# Patient Record
Sex: MALE | Race: BLACK OR AFRICAN AMERICAN | NOT HISPANIC OR LATINO | URBAN - METROPOLITAN AREA
[De-identification: names, ages, dates, MRNs, and addresses within clinical notes are randomized per-mention and may not be internally consistent; named-entity substitution may affect disease eponyms.]

---

## 2020-11-18 ENCOUNTER — OFFICE VISIT (OUTPATIENT)
Dept: URBAN - METROPOLITAN AREA CLINIC 78 | Facility: CLINIC | Age: 20
End: 2020-11-18

## 2020-12-02 ENCOUNTER — OFFICE VISIT (OUTPATIENT)
Dept: URBAN - METROPOLITAN AREA CLINIC 78 | Facility: CLINIC | Age: 20
End: 2020-12-02
Payer: COMMERCIAL

## 2020-12-02 VITALS
SYSTOLIC BLOOD PRESSURE: 130 MMHG | RESPIRATION RATE: 16 BRPM | HEIGHT: 71 IN | BODY MASS INDEX: 23.3 KG/M2 | DIASTOLIC BLOOD PRESSURE: 74 MMHG | HEART RATE: 57 BPM | WEIGHT: 166.4 LBS | TEMPERATURE: 97.8 F

## 2020-12-02 DIAGNOSIS — K83.01 PRIMARY SCLEROSING CHOLANGITIS: ICD-10-CM

## 2020-12-02 DIAGNOSIS — R93.89 ABNORMAL MRI: ICD-10-CM

## 2020-12-02 PROBLEM — 169083003 MRI SCAN ABNORMAL: Status: ACTIVE | Noted: 2020-12-02

## 2020-12-02 PROCEDURE — G8483 FLU IMM NO ADMIN DOC REA: HCPCS | Performed by: INTERNAL MEDICINE

## 2020-12-02 PROCEDURE — 99213 OFFICE O/P EST LOW 20 MIN: CPT | Performed by: INTERNAL MEDICINE

## 2020-12-02 NOTE — HPI-TODAY'S VISIT:
Patient is doing well Denies episodes of pain  Deneis fever  chills  Has seen Dr Nicolas Melendez   Did not get Flu vaccine  Occassional Marijuana use  Drink occassionally  Denies Nicotine  Normal olonoscopy with ileoscopy 12/19 by me   Reports a negative STD check in Summer  Images in 2019 :Sequential imaging including ultrasound, CT scan and lastly an MRCP are highly suggest to of intra and extra hepatic ductal changes consistent with sclerosing cholangitis versus choledochal cyst. Patient was also noted to have significant elevation in liver enzymes and mild elevation in bilirubin (-) drug use except occsional Marijuana (-) Hepatitis panel A/B/C (-) supplement use or herbal drug use (-) Fhx of IBD or liver problems

## 2020-12-03 LAB
A/G RATIO: 2.2
ALBUMIN: 4.9
ALKALINE PHOSPHATASE: 73
ALT (SGPT): 39
AST (SGOT): 28
BASO (ABSOLUTE): 0
BASOS: 1
BILIRUBIN, TOTAL: 0.9
BUN/CREATININE RATIO: 10
BUN: 11
CALCIUM: 9.6
CARBON DIOXIDE, TOTAL: 25
CHLORIDE: 106
CREATININE: 1.06
EGFR IF AFRICN AM: 115
EGFR IF NONAFRICN AM: 99
EOS (ABSOLUTE): 0
EOS: 1
GLOBULIN, TOTAL: 2.2
GLUCOSE: 95
HEMATOCRIT: 43.7
HEMATOLOGY COMMENTS:: (no result)
HEMOGLOBIN: 14.6
IMMATURE CELLS: (no result)
IMMATURE GRANS (ABS): 0
IMMATURE GRANULOCYTES: 0
LYMPHS (ABSOLUTE): 1.8
LYMPHS: 52
MCH: 28.7
MCHC: 33.4
MCV: 86
MONOCYTES(ABSOLUTE): 0.4
MONOCYTES: 10
NEUTROPHILS (ABSOLUTE): 1.3
NEUTROPHILS: 36
NRBC: (no result)
PLATELETS: 204
POTASSIUM: 4.4
PROTEIN, TOTAL: 7.1
RBC: 5.09
RDW: 14
SODIUM: 142
WBC: 3.5

## 2021-12-23 ENCOUNTER — OFFICE VISIT (OUTPATIENT)
Dept: URBAN - METROPOLITAN AREA CLINIC 78 | Facility: CLINIC | Age: 21
End: 2021-12-23
Payer: COMMERCIAL

## 2021-12-23 DIAGNOSIS — R10.10 PAIN OF UPPER ABDOMEN: ICD-10-CM

## 2021-12-23 DIAGNOSIS — K83.01 PRIMARY SCLEROSING CHOLANGITIS: ICD-10-CM

## 2021-12-23 PROCEDURE — 99214 OFFICE O/P EST MOD 30 MIN: CPT | Performed by: INTERNAL MEDICINE

## 2021-12-23 NOTE — HPI-TODAY'S VISIT:
Patient known to me.   Patient is presenting with predominantly upper abdominal pain which is above the bellybutton which is gotten worse over the last 3 months. Pain is postprandial and gets aggravated by sitting and deep breathing.  It can happen both in the morning and evening .  Pain is described as central/ epigastric  7 out of 10 last 1 hour post prandial associated with chills but denies any fever ,any change in urine color ,itching.  It is associated with urgency to go more often but not runny stools .  Somewhere in February/ April he was seen and treated for chlamydia and gonorrhea via an urgent care reportedly HIV testing was negative.  He has cut down on his marijuana use.  Denies diarrhea, blood in stool, melena, unintentional weight loss, nausea vomiting, pruritus, rectal bleeding. DATA : Since last visit  he has seen Dr. Nicolas Yan MRI suggestive of PSC .MRI dated 11/27/2019 which revealed intra and extrahepatic beaded appearance.   Colonoscopy with random biopsies which was unremarkable .. Ultrasound dated 11/26/2019 revealed dilated ducts MRI dated November 27, 2019 revealed intra and extrahepatic beaded appearance with few short segment strictures T bili was 3.8, alkaline phosphatase 147, ,   Immunoglobulin G subclass 4: 50 Hepatitis a/B/C: Negative ANCA: Less than 1: 20 Celiac panel negative Antiactin/AMA/smooth muscle: Negative Tox screen negative except Canniboid

## 2021-12-28 ENCOUNTER — TELEPHONE ENCOUNTER (OUTPATIENT)
Dept: URBAN - METROPOLITAN AREA CLINIC 78 | Facility: CLINIC | Age: 21
End: 2021-12-28

## 2021-12-28 RX ORDER — PANTOPRAZOLE SODIUM 40 MG/1
1 TABLET TABLET, DELAYED RELEASE ORAL ONCE A DAY
Qty: 30 | OUTPATIENT
Start: 2021-12-28

## 2022-01-07 ENCOUNTER — OFFICE VISIT (OUTPATIENT)
Dept: URBAN - METROPOLITAN AREA SURGERY CENTER 15 | Facility: SURGERY CENTER | Age: 22
End: 2022-01-07
Payer: COMMERCIAL

## 2022-01-07 ENCOUNTER — CLAIMS CREATED FROM THE CLAIM WINDOW (OUTPATIENT)
Dept: URBAN - METROPOLITAN AREA CLINIC 4 | Facility: CLINIC | Age: 22
End: 2022-01-07
Payer: COMMERCIAL

## 2022-01-07 DIAGNOSIS — K31.89 DUODENAL ERYTHEMA: ICD-10-CM

## 2022-01-07 DIAGNOSIS — R10.13 ABDOMINAL DISCOMFORT, EPIGASTRIC: ICD-10-CM

## 2022-01-07 PROCEDURE — 43239 EGD BIOPSY SINGLE/MULTIPLE: CPT | Performed by: INTERNAL MEDICINE

## 2022-01-07 PROCEDURE — G8907 PT DOC NO EVENTS ON DISCHARG: HCPCS | Performed by: INTERNAL MEDICINE

## 2022-01-07 PROCEDURE — 88312 SPECIAL STAINS GROUP 1: CPT | Performed by: PATHOLOGY

## 2022-01-07 PROCEDURE — 88305 TISSUE EXAM BY PATHOLOGIST: CPT | Performed by: PATHOLOGY

## 2022-01-07 RX ORDER — PANTOPRAZOLE SODIUM 40 MG/1
1 TABLET TABLET, DELAYED RELEASE ORAL ONCE A DAY
Qty: 30 | Status: ACTIVE | COMMUNITY
Start: 2021-12-28

## 2022-01-10 ENCOUNTER — TELEPHONE ENCOUNTER (OUTPATIENT)
Dept: URBAN - METROPOLITAN AREA CLINIC 78 | Facility: CLINIC | Age: 22
End: 2022-01-10

## 2022-01-19 ENCOUNTER — TELEPHONE ENCOUNTER (OUTPATIENT)
Dept: URBAN - METROPOLITAN AREA CLINIC 78 | Facility: CLINIC | Age: 22
End: 2022-01-19

## 2022-10-31 ENCOUNTER — TELEPHONE ENCOUNTER (OUTPATIENT)
Dept: URBAN - METROPOLITAN AREA CLINIC 92 | Facility: CLINIC | Age: 22
End: 2022-10-31

## 2022-12-07 ENCOUNTER — OFFICE VISIT (OUTPATIENT)
Dept: URBAN - METROPOLITAN AREA CLINIC 78 | Facility: CLINIC | Age: 22
End: 2022-12-07
Payer: COMMERCIAL

## 2022-12-07 VITALS
RESPIRATION RATE: 16 BRPM | BODY MASS INDEX: 25.42 KG/M2 | SYSTOLIC BLOOD PRESSURE: 117 MMHG | HEIGHT: 71 IN | WEIGHT: 181.6 LBS | DIASTOLIC BLOOD PRESSURE: 71 MMHG | TEMPERATURE: 98.1 F | HEART RATE: 89 BPM

## 2022-12-07 DIAGNOSIS — K83.01 PRIMARY SCLEROSING CHOLANGITIS: ICD-10-CM

## 2022-12-07 DIAGNOSIS — R10.10 PAIN OF UPPER ABDOMEN: ICD-10-CM

## 2022-12-07 DIAGNOSIS — K25.9 GASTRIC EROSION DETERMINED BY ENDOSCOPY: ICD-10-CM

## 2022-12-07 PROBLEM — 235651006: Status: ACTIVE | Noted: 2022-12-07

## 2022-12-07 PROBLEM — 197441003 PRIMARY SCLEROSING CHOLANGITIS: Status: ACTIVE | Noted: 2020-12-02

## 2022-12-07 PROCEDURE — 99213 OFFICE O/P EST LOW 20 MIN: CPT | Performed by: INTERNAL MEDICINE

## 2022-12-07 RX ORDER — PANTOPRAZOLE SODIUM 40 MG/1
1 TABLET TABLET, DELAYED RELEASE ORAL ONCE A DAY
Qty: 30 | Status: ACTIVE | COMMUNITY
Start: 2021-12-28

## 2022-12-07 NOTE — HPI-TODAY'S VISIT:
Patient is presenting with predominantly upper abdominal pain which is above the bellybutton which is gotten worse over the last 3 months. Pain is postprandial and gets aggravated by sitting and deep breathing.  It can happen both in the morning and evening .  Pain is described as central/ epigastric  7 out of 10 last 1 hour  denies any fever ,any change in urine color ,itching.   BM are fine  Denies rectal bleeding  He is also gotten a second opinion about with Dr Banerjee  who saw him for abdominal pain and Rxed Pantoprazole   Goes to Gymn and as per mom he takes Supplements   Somewhere in February/ April 2021m he was seen and treated for chlamydia and gonorrhea via an urgent care reportedly HIV testing was negative. He has cut down on his marijuana use.    DATA : Since last visit  he has seen Dr. Nicolas Yan MRI suggestive of PSC .MRI dated 11/27/2019 which revealed intra and extrahepatic beaded appearance.   Colonoscopy with random biopsies which was unremarkable .. Ultrasound dated 11/26/2019 revealed dilated ducts MRI dated November 27, 2019 revealed intra and extrahepatic beaded appearance with few short segment strictures T bili was 3.8, alkaline phosphatase 147, ,   Immunoglobulin G subclass 4: 50 Hepatitis a/B/C: Negative ANCA: Less than 1: 20 Celiac panel negative Antiactin/AMA/smooth muscle: Negative Tox screen negative except Canniboid

## 2023-12-13 ENCOUNTER — OFFICE VISIT (OUTPATIENT)
Dept: URBAN - METROPOLITAN AREA CLINIC 94 | Facility: CLINIC | Age: 23
End: 2023-12-13

## 2023-12-14 ENCOUNTER — DASHBOARD ENCOUNTERS (OUTPATIENT)
Age: 23
End: 2023-12-14

## 2023-12-14 ENCOUNTER — OFFICE VISIT (OUTPATIENT)
Dept: URBAN - METROPOLITAN AREA CLINIC 94 | Facility: CLINIC | Age: 23
End: 2023-12-14
Payer: COMMERCIAL

## 2023-12-14 ENCOUNTER — LAB OUTSIDE AN ENCOUNTER (OUTPATIENT)
Dept: URBAN - METROPOLITAN AREA CLINIC 94 | Facility: CLINIC | Age: 23
End: 2023-12-14

## 2023-12-14 VITALS
BODY MASS INDEX: 27.58 KG/M2 | OXYGEN SATURATION: 96 % | TEMPERATURE: 97.2 F | DIASTOLIC BLOOD PRESSURE: 52 MMHG | HEIGHT: 71 IN | WEIGHT: 197 LBS | SYSTOLIC BLOOD PRESSURE: 119 MMHG | HEART RATE: 63 BPM

## 2023-12-14 DIAGNOSIS — K25.7: ICD-10-CM

## 2023-12-14 DIAGNOSIS — K83.01 PRIMARY SCLEROSING CHOLANGITIS: ICD-10-CM

## 2023-12-14 DIAGNOSIS — R10.30 LOWER ABDOMINAL PAIN: ICD-10-CM

## 2023-12-14 DIAGNOSIS — K25.9 GASTRIC ULCER WITHOUT HEMORRHAGE OR PERFORATION, UNSPECIFIED CHRONICITY: ICD-10-CM

## 2023-12-14 DIAGNOSIS — R10.13 EPIGASTRIC PAIN: ICD-10-CM

## 2023-12-14 PROBLEM — 73481001: Status: ACTIVE | Noted: 2023-12-14

## 2023-12-14 PROCEDURE — 99214 OFFICE O/P EST MOD 30 MIN: CPT | Performed by: INTERNAL MEDICINE

## 2023-12-14 PROCEDURE — 99214 OFFICE O/P EST MOD 30 MIN: CPT | Performed by: PHYSICIAN ASSISTANT

## 2023-12-14 RX ORDER — SUCRALFATE 1 G/1
1 TABLET ON AN EMPTY STOMACH TABLET ORAL
Qty: 90 | Refills: 2 | OUTPATIENT
Start: 2023-12-14 | End: 2024-03-13

## 2023-12-14 RX ORDER — PANTOPRAZOLE SODIUM 40 MG/1
1 TABLET TABLET, DELAYED RELEASE ORAL ONCE A DAY
Qty: 30 | Status: ACTIVE | COMMUNITY
Start: 2021-12-28

## 2023-12-14 RX ORDER — PANTOPRAZOLE SODIUM 40 MG/1
1 TABLET TABLET, DELAYED RELEASE ORAL ONCE A DAY
Qty: 30 | Refills: 5 | OUTPATIENT
Start: 2023-12-14

## 2023-12-20 LAB
A/G RATIO: 2.4
ABSOLUTE BASOPHILS: 21
ABSOLUTE EOSINOPHILS: 28
ABSOLUTE LYMPHOCYTES: 1974
ABSOLUTE MONOCYTES: 277
ABSOLUTE NEUTROPHILS: 1201
ALBUMIN: 4.7
ALKALINE PHOSPHATASE: 66
ALT (SGPT): 32
AST (SGOT): 22
BASOPHILS: 0.6
BILIRUBIN, TOTAL: 0.5
BUN/CREATININE RATIO: (no result)
BUN: 12
C-REACTIVE PROTEIN, QUANT: 0.3
CALCIUM: 9.7
CARBON DIOXIDE, TOTAL: 28
CHLORIDE: 105
CREATININE: 1.11
EGFR: 95
EOSINOPHILS: 0.8
GLOBULIN, TOTAL: 2
GLUCOSE: 83
H PYLORI BREATH TEST: NOT DETECTED
HEMATOCRIT: 43
HEMOGLOBIN: 14.1
IMMUNOGLOBULIN A, QN, SERUM: 135
IMMUNOGLOBULIN G, QN, SERUM: 1115
INR: 1
LYMPHOCYTES: 56.4
MCH: 28.5
MCHC: 32.8
MCV: 86.9
MONOCYTES: 7.9
MPV: 12.7
NEUTROPHILS: 34.3
PLATELET COUNT: 214
POTASSIUM: 4.5
PROTEIN, TOTAL: 6.7
PT: 10.6
RDW: 13.1
RED BLOOD CELL COUNT: 4.95
SODIUM: 140
T-TRANSGLUTAMINASE (TTG) IGA: <1
WHITE BLOOD CELL COUNT: 3.5

## 2024-01-08 ENCOUNTER — OFFICE VISIT (OUTPATIENT)
Dept: URBAN - METROPOLITAN AREA CLINIC 78 | Facility: CLINIC | Age: 24
End: 2024-01-08

## 2024-01-17 ENCOUNTER — OFFICE VISIT (OUTPATIENT)
Dept: URBAN - METROPOLITAN AREA CLINIC 94 | Facility: CLINIC | Age: 24
End: 2024-01-17

## 2024-02-08 ENCOUNTER — OV EP (OUTPATIENT)
Dept: URBAN - METROPOLITAN AREA CLINIC 94 | Facility: CLINIC | Age: 24
End: 2024-02-08

## 2024-02-08 RX ORDER — PANTOPRAZOLE SODIUM 40 MG/1
1 TABLET TABLET, DELAYED RELEASE ORAL ONCE A DAY
Qty: 30 | Refills: 5 | Status: ACTIVE | COMMUNITY
Start: 2023-12-14

## 2024-02-08 RX ORDER — SUCRALFATE 1 G/1
1 TABLET ON AN EMPTY STOMACH TABLET ORAL
Qty: 90 | Refills: 2 | Status: ACTIVE | COMMUNITY
Start: 2023-12-14 | End: 2024-03-13

## 2024-02-08 RX ORDER — PANTOPRAZOLE SODIUM 40 MG/1
1 TABLET TABLET, DELAYED RELEASE ORAL ONCE A DAY
Qty: 30 | Status: ACTIVE | COMMUNITY
Start: 2021-12-28

## 2024-03-27 NOTE — HPI-TODAY'S VISIT:
24 yo M evaluated today for hx of abdominal pain and concerns for primary sclerrosing cholangitis   EGD 2022- DR Guzman -four non bleeding gastric ulcers in gastric body  Bx gastric and duodenal Bx no significant abnormalities.   Colonoscopy - 2019 - nml ileum, normal entire colon   Pt reports 1-2 yr hx of epigastric and periumbilical region. Patient reports in the am upon waking will have epigastric pain. He notes this if he eats late at night. Present with taking a deep breath will note pain. Patient denies N/V or heart burn.   Pt may have some constipation at times maybe once every other week. Otherwise, bowels move daily. Denies BR blood. May have dark stools on occasion.  Denies medication use Smokes Marijuana on occasion - once/week.  Denies NSAID use or abx use or iron use.   Dr Nicolas Yan MRI suggestive of PSC.MRI dated 11/27/2019 which revealed intra and extrahepatic beaded appearance. Ultrasound dated 11/26/2019 revealed dilated ducts MRI dated November 27, 2019 revealed intra and extrahepatic beaded appearance with few short segment strictures T bili was 3.8, alkaline phosphatase 147, ,  Pt denies f/u visit   Immunoglobulin G subclass 4: 50 Hepatitis a/B/C: Negative ANCA: Less than 1: 20 Celiac panel negative Anti-actin/AMA/smooth muscle: Negative [Negative] : Heme/Lymph [Dyspnea on exertion] : dyspnea during exertion [SOB] : no shortness of breath [Chest Discomfort] : no chest discomfort [Lower Ext Edema] : no extremity edema [Leg Claudication] : no intermittent leg claudication [Orthopnea] : no orthopnea [Palpitations] : no palpitations [PND] : no PND [Syncope] : no syncope